# Patient Record
Sex: FEMALE | Race: WHITE
[De-identification: names, ages, dates, MRNs, and addresses within clinical notes are randomized per-mention and may not be internally consistent; named-entity substitution may affect disease eponyms.]

---

## 2018-02-21 ENCOUNTER — HOSPITAL ENCOUNTER (OUTPATIENT)
Dept: HOSPITAL 45 - C.LABPBG | Age: 63
Discharge: HOME | End: 2018-02-21
Attending: SURGERY
Payer: COMMERCIAL

## 2018-02-21 DIAGNOSIS — I70.219: Primary | ICD-10-CM

## 2018-02-21 LAB
BUN SERPL-MCNC: 15 MG/DL (ref 7–18)
CREAT SERPL-MCNC: 0.82 MG/DL (ref 0.6–1.2)

## 2018-02-26 ENCOUNTER — HOSPITAL ENCOUNTER (OUTPATIENT)
Dept: HOSPITAL 45 - C.CTS | Age: 63
Discharge: HOME | End: 2018-02-26
Attending: SURGERY
Payer: COMMERCIAL

## 2018-02-26 DIAGNOSIS — I70.1: ICD-10-CM

## 2018-02-26 DIAGNOSIS — I70.211: Primary | ICD-10-CM

## 2018-02-26 DIAGNOSIS — I70.0: ICD-10-CM

## 2018-02-26 NOTE — DIAGNOSTIC IMAGING REPORT
ANGIO AA LOR LE RUNOFF



CT DOSE: 938.66 mGy.cm



HISTORY:  62 years-old Female presents with bilateral lower extremity pain for 3

to 4 months. Concern for peripheral vascular disease.



TECHNIQUE: Multiple CTA images of the abdomen and pelvis with bilateral lower

extremity runoff were obtained after the intravenous administration of 120 ml

Optiray 320.  Coronal and sagittal MIPS were obtained from the axial data set

and were submitted for review.  All measurements were obtained according to

NASCET criteria. A dose lowering technique was utilized adhering to the

principles of ALARA.



COMPARISON: Chest radiographs 6/30/2009.



FINDINGS: 



CTA:  

Moderate enlargement of the imaged inferior cardiac chambers. There is extensive

mixed atheromatous and atherosclerotic plaquing of the imaged descending

thoracic as well as the abdominal aorta. No abdominal aortic aneurysm or

dissection. There is mild ectasia of the distal abdominal aorta which measures

2.3 x 2.2 cm.



There is atherosclerotic plaquing noted at the origins of the celiac and

superior mesenteric arteries causing less than 50% luminal narrowing. The

inferior mesenteric artery is widely patent. Distal branches of the superior

mesenteric artery also demonstrate prominent mixed plaquing. 60% luminal

narrowing involves the origin of the right renal artery secondary to calcific

plaquing. No significant stenosis of the left renal artery.



Extensive mixed plaquing is also seen extending into the bilateral common,

external and internal iliac arteries prominent atheromatous plaquing of the

right external iliac artery is seen on image 306 series 3 causing 50% luminal

narrowing. There is extensive mixed plaquing at the bifurcation of the

superficial and profunda femoris arteries on the left resulting in approximately

50% luminal narrowing. 50% luminal narrowing involves the mid left superficial

femoral artery on image 456 series 3. 70% luminal narrowing involves the right

superficial femoral artery image 512 series 3 secondary to atheromatous

plaquing. The bilateral popliteal arteries are patent.



Prominent mixed plaquing is noted throughout the bilateral lower extremities

below the level of the knees however there is no focal vessel occlusion or

high-grade narrowing identified. Three-vessel arterial inflow extends to the

level of the ankles bilaterally.



CT ABDOMEN/PELVIS:

Mild bibasilar bronchial wall thickening with areas of bronchial mucous plugging

and minimal subsegmental bibasilar atelectasis. There is no pneumatosis or

pneumoperitoneum identified.



The liver, gallbladder, spleen and right adrenal gland are unremarkable.

Indeterminate 7 x 8 mm nodule of the medial limb left adrenal gland is seen

which is soft tissue attenuating. There is moderate to severe generalized

pancreatic atrophy.



Kidneys, and ureters are unremarkable. There is mild urinary bladder distention.

Prior hysterectomy. No adnexal mass lesions identified. No bulky adenopathy.



There is no bowel obstruction or focal bowel wall thickening identified.

Moderate stool volume throughout the colon suggest constipation. Soft tissues

are unremarkable. Trace right-sided Baker's cyst. Cystic lesion measuring 2.8 x

1.4 cm is noted within the left popliteal fossa suggesting a Baker's cyst. The

bones appear intact. Multilevel facet arthrosis of the lumbar spine. Mild

degenerative changes about the bilateral feet.



IMPRESSION:  

1. Extensive mixed atheromatous and atherosclerotic plaquing of the aorta and

proximal branch vessels without evidence of aortic aneurysm or dissection.

2. 60% luminal narrowing involves the origin of the right renal artery secondary

to calcified plaque.

3. 70% luminal narrowing involves the mid right superficial femoral artery as

detailed above. There is three-vessel arterial flow to the level of the ankles

bilaterally.

4. No acute intra-abdominal or intrapelvic abnormality identified.

5. Additional findings as above.







The above report was generated using voice recognition software. It may contain

grammatical, syntax or spelling errors.











Electronically signed by:  Milton Ash M.D.

2/26/2018 12:16 PM



Dictated Date/Time:  2/26/2018 11:59 AM

## 2018-03-14 ENCOUNTER — HOSPITAL ENCOUNTER (OUTPATIENT)
Dept: HOSPITAL 45 - C.LABPBG | Age: 63
Discharge: HOME | End: 2018-03-14
Attending: INTERNAL MEDICINE
Payer: COMMERCIAL

## 2018-03-14 DIAGNOSIS — Z01.818: Primary | ICD-10-CM

## 2018-03-14 LAB
BUN SERPL-MCNC: 11 MG/DL (ref 7–18)
CALCIUM SERPL-MCNC: 8.6 MG/DL (ref 8.5–10.1)
CO2 SERPL-SCNC: 26 MMOL/L (ref 21–32)
CREAT SERPL-MCNC: 0.63 MG/DL (ref 0.6–1.2)
EOSINOPHIL NFR BLD AUTO: 313 K/UL (ref 130–400)
GLUCOSE SERPL-MCNC: 120 MG/DL (ref 70–99)
HCT VFR BLD CALC: 24.6 % (ref 37–47)
HGB BLD-MCNC: 7.2 G/DL (ref 12–16)
INR PPP: 0.9 (ref 0.9–1.1)
MCH RBC QN AUTO: 19.7 PG (ref 25–34)
MCHC RBC AUTO-ENTMCNC: 29.3 G/DL (ref 32–36)
MCV RBC AUTO: 67.4 FL (ref 80–100)
PMV BLD AUTO: 8.9 FL (ref 7.4–10.4)
POTASSIUM SERPL-SCNC: 3.4 MMOL/L (ref 3.5–5.1)
PTT PATIENT: 25.2 SECONDS (ref 21–31)
RED CELL DISTRIBUTION WIDTH CV: 17.4 % (ref 11.5–14.5)
RED CELL DISTRIBUTION WIDTH SD: 43 FL (ref 36.4–46.3)
SODIUM SERPL-SCNC: 138 MMOL/L (ref 136–145)
WBC # BLD AUTO: 9.14 K/UL (ref 4.8–10.8)

## 2018-03-20 ENCOUNTER — HOSPITAL ENCOUNTER (OUTPATIENT)
Dept: HOSPITAL 45 - C.NUCL | Age: 63
Discharge: HOME | End: 2018-03-20
Attending: PHYSICIAN ASSISTANT
Payer: COMMERCIAL

## 2018-03-20 DIAGNOSIS — I70.219: ICD-10-CM

## 2018-03-20 DIAGNOSIS — R07.9: ICD-10-CM

## 2018-03-20 DIAGNOSIS — I10: Primary | ICD-10-CM

## 2018-03-20 NOTE — MYOCARDIAL PERFUSION STUDY
Myocardial Perfusion Study Rpt


Myocardial Perfusion Study Rpt


Date of Service


3/20/2018


Myocardial Perfusion Study Rpt


Procedure:


1. Myocardial perfusion study performed in multiple views/images


2. Lexiscan pharmacologic stress ECG





Indications:


1. Chest pain





Consent:  Informed written consent was obtained prior to the procedure.


Ordering physician: Tamie Herron PA-C





Procedural details:  


For the stress portion of the study, Lexiscan 0.4 mg was intravenously 

administered followed by a saline flush.  This was followed by 31.5 mCi of 

technetium 99m Cardiolite, injected at 11:40 a.m. on 03/20/2018.  30 minutes 

following the injection, imaging of the heart was performed in multiple 

projections.  For the rest portion of the study, 10.8 mCi technetium 99m 

Cardiolite was injected intravenously at 9:45 a.m. on 03/20/2018.  1 hour 

following the injection, imaging of the heart was performed in the same 

projections.





Lexiscan stress ECG:





Resting ECG demonstrated:  Sinus rhythm at 74 bpm.


Maximum heart rate:  95 bpm


Resting blood pressure:  173/84 mmHg


Maximum blood pressure:  173/84 mmHg


Maximal, age-predicted heart rate:  60 %


Significant ST changes:  None


Arrhythmia:  None 


Symptoms:  No chest pain reported.





Findings:


Rotating raw imaging demonstrated no significant lung uptake.  There is no 

significant motion artifact.  Heart size appeared normal.  Myocardial perfusion 

demonstrated a small area of mildly reduced uptake involving the mid to distal 

anteroseptum which was fixed in post stress and rest imaging.  There were no 

significant reversible defects to suggest ischemia.





Ejection fraction:  73 %


Wall motion:  Normal


No significant transient ischemic dilation.





Impression:


1. Negative myocardial perfusion study for ischemia.


2.  Small, fixed mid to distal anteroseptal defect likely attenuation artifact 

given normal wall motion.


3. Normal wall motion.


4. Normal left ventricular systolic function.  EF 73%.


5. No chest pain or arrhythmia.


6. Nondiagnostic Lexiscan ECG.

## 2018-03-29 ENCOUNTER — HOSPITAL ENCOUNTER (EMERGENCY)
Dept: HOSPITAL 45 - C.EDB | Age: 63
LOS: 1 days | Discharge: HOME | End: 2018-03-30
Payer: COMMERCIAL

## 2018-03-29 VITALS
OXYGEN SATURATION: 95 % | SYSTOLIC BLOOD PRESSURE: 137 MMHG | TEMPERATURE: 98.78 F | HEART RATE: 85 BPM | DIASTOLIC BLOOD PRESSURE: 74 MMHG

## 2018-03-29 VITALS
BODY MASS INDEX: 26.91 KG/M2 | HEIGHT: 64.02 IN | BODY MASS INDEX: 26.91 KG/M2 | WEIGHT: 157.63 LBS | HEIGHT: 64.02 IN | WEIGHT: 157.63 LBS

## 2018-03-29 VITALS
HEART RATE: 93 BPM | TEMPERATURE: 98.6 F | OXYGEN SATURATION: 92 % | SYSTOLIC BLOOD PRESSURE: 162 MMHG | DIASTOLIC BLOOD PRESSURE: 67 MMHG

## 2018-03-29 VITALS
SYSTOLIC BLOOD PRESSURE: 140 MMHG | TEMPERATURE: 98.6 F | HEART RATE: 86 BPM | DIASTOLIC BLOOD PRESSURE: 63 MMHG | OXYGEN SATURATION: 93 %

## 2018-03-29 VITALS
OXYGEN SATURATION: 93 % | DIASTOLIC BLOOD PRESSURE: 77 MMHG | TEMPERATURE: 98.24 F | HEART RATE: 86 BPM | SYSTOLIC BLOOD PRESSURE: 160 MMHG

## 2018-03-29 DIAGNOSIS — R06.02: ICD-10-CM

## 2018-03-29 DIAGNOSIS — E78.5: ICD-10-CM

## 2018-03-29 DIAGNOSIS — M19.90: ICD-10-CM

## 2018-03-29 DIAGNOSIS — D64.9: Primary | ICD-10-CM

## 2018-03-29 DIAGNOSIS — I10: ICD-10-CM

## 2018-03-29 DIAGNOSIS — F17.200: ICD-10-CM

## 2018-03-29 DIAGNOSIS — Z90.710: ICD-10-CM

## 2018-03-29 DIAGNOSIS — Z79.82: ICD-10-CM

## 2018-03-29 DIAGNOSIS — J44.9: ICD-10-CM

## 2018-03-29 LAB
ALBUMIN SERPL-MCNC: 4.1 GM/DL (ref 3.4–5)
ALP SERPL-CCNC: 110 U/L (ref 45–117)
ALT SERPL-CCNC: 14 U/L (ref 12–78)
AST SERPL-CCNC: 14 U/L (ref 15–37)
BASOPHILS # BLD: 0.05 K/UL (ref 0–0.2)
BASOPHILS NFR BLD: 0.5 %
BUN SERPL-MCNC: 9 MG/DL (ref 7–18)
CALCIUM SERPL-MCNC: 9.1 MG/DL (ref 8.5–10.1)
CO2 SERPL-SCNC: 25 MMOL/L (ref 21–32)
CREAT SERPL-MCNC: 0.65 MG/DL (ref 0.6–1.2)
EOS ABS #: 0.2 K/UL (ref 0–0.5)
EOSINOPHIL NFR BLD AUTO: 419 K/UL (ref 130–400)
GLUCOSE SERPL-MCNC: 96 MG/DL (ref 70–99)
HCT VFR BLD CALC: 26.6 % (ref 37–47)
HGB BLD-MCNC: 7.4 G/DL (ref 12–16)
IG#: 0.04 K/UL (ref 0–0.02)
IMM GRANULOCYTES NFR BLD AUTO: 24.9 %
INR PPP: 0.9 (ref 0.9–1.1)
LIPASE: 50 U/L (ref 73–393)
LYMPHOCYTES # BLD: 2.71 K/UL (ref 1.2–3.4)
MCH RBC QN AUTO: 18.6 PG (ref 25–34)
MCHC RBC AUTO-ENTMCNC: 27.8 G/DL (ref 32–36)
MCV RBC AUTO: 66.8 FL (ref 80–100)
MONO ABS #: 0.92 K/UL (ref 0.11–0.59)
MONOCYTES NFR BLD: 8.4 %
NEUT ABS #: 6.98 K/UL (ref 1.4–6.5)
NEUTROPHILS # BLD AUTO: 1.8 %
NEUTROPHILS NFR BLD AUTO: 64 %
PMV BLD AUTO: 8.6 FL (ref 7.4–10.4)
POTASSIUM SERPL-SCNC: 3.6 MMOL/L (ref 3.5–5.1)
PROT SERPL-MCNC: 8.2 GM/DL (ref 6.4–8.2)
PTT PATIENT: 24.6 SECONDS (ref 21–31)
RED CELL DISTRIBUTION WIDTH CV: 17.5 % (ref 11.5–14.5)
RED CELL DISTRIBUTION WIDTH SD: 43 FL (ref 36.4–46.3)
SODIUM SERPL-SCNC: 135 MMOL/L (ref 136–145)
WBC # BLD AUTO: 10.9 K/UL (ref 4.8–10.8)

## 2018-03-29 NOTE — DIAGNOSTIC IMAGING REPORT
CHEST ONE VIEW PORTABLE



CLINICAL HISTORY: 62 years-old Female presenting with Pt c/o SOB. 



TECHNIQUE: Portable upright AP view of the chest was obtained.



COMPARISON: 630/2009.



FINDINGS:

Atherosclerosis of aortic arch. Cardiac silhouette enlarged. Calcified granuloma

suggested in the right midlung. No other focal opacity. No large effusion or

pneumothorax. Osseous structures normal. Upper abdomen normal.



IMPRESSION:

1.  Cardiomegaly. Otherwise no acute cardiopulmonary disease.







Electronically signed by:  Semaj Go M.D.

3/29/2018 8:40 PM



Dictated Date/Time:  3/29/2018 8:40 PM

## 2018-03-29 NOTE — EMERGENCY ROOM VISIT NOTE
History


Report prepared by [place]:  Bruna Valentine


Under the Supervision of:  Dr. Ronal Guadarrama M.D.


First contact with patient:  19:44


Chief Complaint:  RESPIRATORY PROBLEMS


Stated Complaint:  TROUBLE BREATHING AND LEG PAIN


Nursing Triage Summary:  


Patient was referred by her PCP for low H&H, notes SOB and pain in her leg.





History of Present Illness


The patient is a 62 year old female who presents to the Emergency Room with 

complaints of constant shortness of breath beginning two weeks ago. She notes 

chest pain, leg pain, and a headache. She states her shortness of breath 

worsens with  walking.  The patient was referred to the ED by her PCP for a low 

H & H level. The patient's blood work was done two weeks ago and she was just 

notified of her low H & H level today. The patient is not on any blood 

thinners. She denies any abdominal pain or black or tarry stools. The patient 

had a blood transfusion previously when she delivered her daughter.





   Source of History:  patient


   Onset:  two weeks ago


   Position:  other (generalized)


   Quality:  other (shortness of breath)


   Timing:  constant


   Associated Symptoms:  + headache, + chest pain, + SOB, No abdominal pain, No 

melena





Review of Systems


See HPI for pertinent positives & negatives. A total of 10 systems reviewed and 

were otherwise negative.





Past Medical & Surgical


Medical Problems:


(1) Appendectomy


(2)  section


(3) History of - hypertension


(4) History of - hysterectomy








Family History





Patient reports no known family medical history.





Social History


Smoking Status:  Current Every Day Smoker


Alcohol Use:  none


Drug Use:  none


Marital Status:  single


Housing Status:  lives alone





Current/Historical Medications


Scheduled


Amitriptyline Hcl (Amitriptyline Hcl), 50 MG PO HS


Aspirin (Aspirin Ec), 81 MG PO DAILY


Atorvastatin (Lipitor), 1 TAB PO DAILY


Fluticasone Prop/Salmeterol (Advair Diskus 500/50 60 Dose), 1 PUFF INH BID


Lisinopril (Zestril), 40 MG PO DAILY


Tramadol Hcl (Ultram),  MG PO Q6


Verapamil Hcl (Calan Sr Ext Rel), 240 MG PO DAILY





Scheduled PRN


Albuterol Hfa (Ventolin Hfa), 2 PUFFS INH


Dextromethorphan-Doxylamine-Ac (Coricidin Hbp Nighttime M), 10 ML PO HS PRN


Hydrocodon/Acetaminophen 10MG/300MG (Vicodin Hp (10MG/300MG)), 1 TAB PO for Pain





Miscellaneous Medications


Carisoprodol (Soma), 350 MG PO





Allergies


Coded Allergies:  


     No Known Allergies (Unverified , 3/29/18)





Physical Exam


Vital Signs











  Date Time  Temp Pulse Resp B/P (MAP) Pulse Ox O2 Delivery O2 Flow Rate FiO2


 


3/30/18 04:01 36.8   154/91 93   


 


3/30/18 03:55  88 21  90   


 


3/30/18 03:46    176/64    


 


3/30/18 03:40  93 16  97   


 


3/30/18 03:31    144/70    


 


3/30/18 03:25  86 36  93   


 


3/30/18 03:16    137/88    


 


3/30/18 03:10  80 18  91   


 


3/30/18 02:56 36.8       


 


3/30/18 02:55  83 23  92   


 


3/30/18 02:50  82 21  92   


 


3/30/18 02:45    159/88    


 


3/30/18 02:35  82 20  92   


 


3/30/18 02:30    161/79    


 


3/30/18 02:20  84 20  92   


 


3/30/18 02:16    164/66    


 


3/30/18 02:07 36.9 85 21  94   


 


3/30/18 02:05  85 26  91   


 


3/30/18 02:00    156/82    


 


3/30/18 01:56  84 23  91   


 


3/30/18 01:46    139/65    


 


3/30/18 01:41  82 24  91   


 


3/30/18 01:31    137/57    


 


3/30/18 01:26  81 25  90   


 


3/30/18 01:23 37.2 78 19 167/78 93 Room Air  


 


3/30/18 01:21  81 22  92   


 


3/30/18 01:15    153/102    


 


3/30/18 01:06  84 23  91   


 


3/30/18 01:05 37.3 84 17     


 


3/30/18 01:01    145/69    


 


3/30/18 00:56  85 19  91   


 


3/30/18 00:51    142/72    


 


3/30/18 00:50 36.9 82 20 142/72 93 Room Air  


 


3/30/18 00:46  85 20 161/70 95 Room Air  


 


3/30/18 00:41  84 23  95   


 


3/30/18 00:40 37.1 84 20 153/60 95   


 


3/30/18 00:40    153/60    


 


3/30/18 00:26  82 19  92   


 


3/30/18 00:21  83 20  91   


 


3/30/18 00:15    148/64    


 


3/30/18 00:06  89 22  93   


 


3/30/18 00:00    141/52    


 


3/29/18 23:51  85 18  90   


 


3/29/18 23:45    125/59    


 


3/29/18 23:36  82 18  93   


 


3/29/18 23:36     92   


 


3/29/18 23:31    137/46    


 


3/29/18 23:21  83 17  94   


 


3/29/18 23:17    156/69    


 


3/29/18 23:15     91   


 


3/29/18 23:15  84 22 156/69 93 Room Air  


 


3/29/18 23:06  85 19  91   


 


3/29/18 23:01    137/74    


 


3/29/18 23:00 37.1 85 22 137/74 95   


 


3/29/18 23:00  86 24  90   


 


3/29/18 22:43 37.0 86 22 140/63 93   


 


3/29/18 22:38 36.8 86 20 160/77 93   


 


3/29/18 22:33 37.0 93 25 162/67 92   


 


3/29/18 22:07  87 18 130/68 95 Room Air  


 


3/29/18 21:00 36.8 90 18 129/76 95 Room Air  


 


3/29/18 20:06     91 Room Air  


 


3/29/18 19:50  87      


 


3/29/18 19:11     95 Room Air  


 


3/29/18 19:09 36.8 89 18 136/67 94 Room Air  











Physical Exam


GENERAL: Awake, alert, well-appearing, in no acute distress


HENT: Normocephalic, atraumatic. Oropharynx unremarkable.


EYES: Normal conjunctiva. Sclera non-icteric.


NECK: Supple. No nuchal rigidity. FROM. No JVD.


RESPIRATORY: Clear to auscultation.


CARDIAC: Regular rate, normal rhythm. Extremities warm and well perfused. 

Pulses equal.


ABDOMEN: Soft, non-distended. No tenderness to palpation. No rebound or 

guarding. No masses.


RECTAL: Deferred.


MUSCULOSKELETAL: Chest examination reveals no tenderness. The back is 

symmetrical on inspection without obvious abnormality. There is no CVA 

tenderness to palpation. No joint edema. 


LOWER EXTREMITIES: Calves are equal size bilaterally and non-tender. No edema. 

No discoloration. 


NEURO: Normal sensorium. No sensory or motor deficits noted. 


SKIN: No rash or jaundice noted.





Medical Decision & Procedures


ER Provider


Diagnostic Interpretation:


Radiology results as stated below per my review and radiologist interpretation:





CHEST ONE VIEW PORTABLE.





FINDINGS:


Atherosclerosis of aortic arch. Cardiac silhouette enlarged. Calcified granuloma


suggested in the right midlung. No other focal opacity. No large effusion or


pneumothorax. Osseous structures normal. Upper abdomen normal.





IMPRESSION:


1.  Cardiomegaly. Otherwise no acute cardiopulmonary disease.











Electronically signed by:  Semaj Go M.D.





VENOUS DOPPLER LWR EXT BILA





FINDINGS: 





Right:


Common femoral vein: Patent.


Greater saphenous vein: Patent.


Deep femoral vein: Patent.


Femoral vein: Patent.


Popliteal vein: Patent.


Calf veins: Limited visualization.





Left:


Common femoral vein: Patent.


Greater saphenous vein: Patent.


Deep femoral vein: Patent.


Femoral vein: Patent.


Popliteal vein: Patent.


Calf veins: Limited visualization.





Other: Anechoic lobular 3.3 x 1.7 x 2.4 cm simple appearing cystic structure in


the left popliteal fossa most consistent with a popliteal cyst.





IMPRESSION:


No evidence of deep venous thrombosis.











Electronically signed by:  Semaj Go M.D.





Laboratory Results


3/29/18 20:21








Red Blood Count 3.98, Mean Corpuscular Volume 66.8, Mean Corpuscular Hemoglobin 

18.6, Mean Corpuscular Hemoglobin Concent 27.8, Mean Platelet Volume 8.6, 

Neutrophils (%) (Auto) 64.0, Lymphocytes (%) (Auto) 24.9, Monocytes (%) (Auto) 

8.4, Eosinophils (%) (Auto) 1.8, Basophils (%) (Auto) 0.5, Neutrophils # (Auto) 

6.98, Lymphocytes # (Auto) 2.71, Monocytes # (Auto) 0.92, Eosinophils # (Auto) 

0.20, Basophils # (Auto) 0.05





3/29/18 20:21

















Test


  3/29/18


20:21 3/29/18


20:23


 


White Blood Count


  10.90 K/uL


(4.8-10.8) 


 


 


Red Blood Count


  3.98 M/uL


(4.2-5.4) 


 


 


Hemoglobin


  7.4 g/dL


(12.0-16.0) 


 


 


Hematocrit 26.6 % (37-47)  


 


Mean Corpuscular Volume


  66.8 fL


() 


 


 


Mean Corpuscular Hemoglobin


  18.6 pg


(25-34) 


 


 


Mean Corpuscular Hemoglobin


Concent 27.8 g/dl


(32-36) 


 


 


Platelet Count


  419 K/uL


(130-400) 


 


 


Mean Platelet Volume


  8.6 fL


(7.4-10.4) 


 


 


Neutrophils (%) (Auto) 64.0 %  


 


Lymphocytes (%) (Auto) 24.9 %  


 


Monocytes (%) (Auto) 8.4 %  


 


Eosinophils (%) (Auto) 1.8 %  


 


Basophils (%) (Auto) 0.5 %  


 


Neutrophils # (Auto)


  6.98 K/uL


(1.4-6.5) 


 


 


Lymphocytes # (Auto)


  2.71 K/uL


(1.2-3.4) 


 


 


Monocytes # (Auto)


  0.92 K/uL


(0.11-0.59) 


 


 


Eosinophils # (Auto)


  0.20 K/uL


(0-0.5) 


 


 


Basophils # (Auto)


  0.05 K/uL


(0-0.2) 


 


 


RDW Standard Deviation


  43.0 fL


(36.4-46.3) 


 


 


RDW Coefficient of Variation


  17.5 %


(11.5-14.5) 


 


 


Immature Granulocyte % (Auto) 0.4 %  


 


Immature Granulocyte # (Auto)


  0.04 K/uL


(0.00-0.02) 


 


 


Polychromasia 1+  


 


Hypochromasia PRESENT  


 


Anisocytosis PRESENT  


 


Microcytosis PRESENT  


 


Prothrombin Time


  9.5 SECONDS


(9.0-12.0) 


 


 


Prothromb Time International


Ratio 0.9 (0.9-1.1) 


  


 


 


Activated Partial


Thromboplast Time 24.6 SECONDS


(21.0-31.0) 


 


 


Partial Thromboplastin Ratio 0.9  


 


Anion Gap


  6.0 mmol/L


(3-11) 


 


 


Est Creatinine Clear Calc


Drug Dose 85.9 ml/min 


  


 


 


Estimated GFR (


American) 109.5 


  


 


 


Estimated GFR (Non-


American 94.5 


  


 


 


BUN/Creatinine Ratio 13.8 (10-20)  


 


Calcium Level


  9.1 mg/dl


(8.5-10.1) 


 


 


Total Bilirubin


  0.2 mg/dl


(0.2-1) 


 


 


Direct Bilirubin


  < 0.1 mg/dl


(0-0.2) 


 


 


Aspartate Amino Transf


(AST/SGOT) 14 U/L (15-37) 


  


 


 


Alanine Aminotransferase


(ALT/SGPT) 14 U/L (12-78) 


  


 


 


Alkaline Phosphatase


  110 U/L


() 


 


 


Total Protein


  8.2 gm/dl


(6.4-8.2) 


 


 


Albumin


  4.1 gm/dl


(3.4-5.0) 


 


 


Lipase


  50 U/L


() 


 


 


Urine Color  YELLOW 


 


Urine Appearance  CLEAR (CLEAR) 


 


Urine pH  6.5 (4.5-7.5) 


 


Urine Specific Gravity


  


  1.008


(1.000-1.030)


 


Urine Protein  NEG (NEG) 


 


Urine Glucose (UA)  NEG (NEG) 


 


Urine Ketones  NEG (NEG) 


 


Urine Occult Blood  NEG (NEG) 


 


Urine Nitrite  NEG (NEG) 


 


Urine Bilirubin  NEG (NEG) 


 


Urine Urobilinogen  NEG (NEG) 


 


Urine Leukocyte Esterase  SMALL (NEG) 


 


Urine WBC (Auto)


  


  10-30 /hpf


(0-5)


 


Urine RBC (Auto)  0-4 /hpf (0-4) 


 


Urine Hyaline Casts (Auto)  1-5 /lpf (0-5) 


 


Urine Epithelial Cells (Auto)  >30 /lpf (0-5) 


 


Urine Bacteria (Auto)  1+ (NEG) 





Labs reviewed by ED physician.





Medications Administered











 Medications


  (Trade)  Dose


 Ordered  Sig/Celestine


 Route  Start Time


 Stop Time Status Last Admin


Dose Admin


 


 Albuterol/


 Ipratropium


  (Duoneb)  12 ml  ONE  ONCE


 INH  3/29/18 20:00


 3/29/18 20:01 DC 3/29/18 20:15


12 ML











ECG Per My Interpretation


Indication:  SOB/dyspnea


Rate (beats per minute):  84


Rhythm:  normal sinus


Findings:  other (no ST elevation or depression, normal axis )





ED Course


: Past medical records reviewed. The patient was evaluated in room B7. A 

complete history and physical examination was performed. 





: Ordered Duoneb 12 ml INH. 





: I discussed the patient's case with Dr. Pedrito ALVARADO, she has agreed to 

evaluate the patient for further management and care.





Medical Decision


Differential diagnosis:


Etiologies such as infections, reactive airway disease, pneumonia, pneumothorax

, COPD, CHF, cardiac ischemia, pulmonary embolism, musculoskeletal, 

gastrointestinal, as well as others were entertained.





This is a 62-year-old female presents emergency department complaining of 

generalized weakness as long shortness of breath.  The patient was found to be 

anemic and sent into the emergency department for blood cells.  The patient 

willingly signed the consent and was placed on the chart.  The patient is 

refusing to be admitted to the hospital due to  holiday.  For this reason 

she was given her packed red blood cells and will follow up with her primary 

care physician.





Medication Reconcilliation


Current Medication List:  was personally reviewed by me





Blood Pressure Screening


Patient's blood pressure:  Elevated blood pressure


Blood pressure disposition:  Referred to PCP (refer to hospitalist)





Consults


Time Called:  


Consulting Physician:  Dr. Pedrito ALVARADO


Returned Call:  


I discussed the patient's case with Dr. Pedrito ALVARADO, she has agreed to evaluate 

the patient for further management and care.





Impression





 Primary Impression:  


 Anemia


 Additional Impression:  


 Shortness of breath





Scribe Attestation


The scribe's documentation has been prepared under my direction and personally 

reviewed by me in its entirety. I confirm that the note above accurately 

reflects all work, treatment, procedures, and medical decision making performed 

by me.





Departure Information


Dispostion


Being Evaluated By Hospitalist





Referrals


Eliazar Carrasco PA-C (PCP)





Patient Instructions


My [facility]





Problem Qualifiers








 Primary Impression:  


 Anemia


 Anemia type:  unspecified type  Qualified Codes:  D64.9 - Anemia, unspecified

## 2018-03-30 VITALS
DIASTOLIC BLOOD PRESSURE: 60 MMHG | OXYGEN SATURATION: 95 % | SYSTOLIC BLOOD PRESSURE: 153 MMHG | HEART RATE: 84 BPM | TEMPERATURE: 98.78 F

## 2018-03-30 VITALS — HEART RATE: 88 BPM

## 2018-03-30 VITALS — DIASTOLIC BLOOD PRESSURE: 91 MMHG | TEMPERATURE: 98.24 F | OXYGEN SATURATION: 93 % | SYSTOLIC BLOOD PRESSURE: 154 MMHG

## 2018-03-30 NOTE — MEDICAL CONSULT
Consultation


Date of Consultation:


Mar 30, 2018.


Attending Physician:





Reason for Consultation:


Possible medical admission


History of Present Illness


Mrs. Xiong is a very pleasant 61yo C female with history of Hypertension, 

COPD and Hyperlipidemia presenting today with symptomatic anemia.  She is 

presently undergoing preoperative workup for a femoral bypass surgery.  

Laboratory results showed marked anemia, Hg=7.2 and Hct=24.6.  This was 

confirmed in ER, 7.4 and 26.6 respectively.  Patient states that she is feeling 

well.  She has occasional intermittent chest tightness that is nonexertional - 

she was recently worked up for this with a nuclear stress test which was 

reported to her as normal.  She has a baseline shortness of breath that is 

stable and unchanged.  She has recently been diagnosed with mild COPD for which 

she was started on inhalers.  Patient denies blood loss.  No melena or 

hematochezia but does report a history of hemorrhoids.  No change in bowel 

habits, thin stools, weight loss or abdominal swelling.  She has never had a 

screening colonoscopy.  No hematuria, bruising or bleeding.  No changes in eye 

or skin color.  No additional complaints at this time.  In the ER she was 

administered a Duoneb and transfused with 2 units of PRBCs.





Past Medical/Surgical History


Medical Problems:


(1) Anemia


(2) COPD


(3) Arthritis


(4) Hypertension


(5) Hyperlipidemia





Past Surgical History:


 x 2


Appendectomy


Umbilical surgery


Hysterectomy for uterine cancer 33 years ago - negative Pap smears in followup








Family History





Patient reports no known family medical history.





Social History


Smoking Status:  Current Every Day Smoker


Smokeless Tobacco Use:  No


Alcohol Use:  none


Drug Use:  none


Marital Status:  single, 


Housing Status:  lives alone, lives with significant other





Allergies


Coded Allergies:  


     No Known Allergies (Unverified , 3/29/18)





Home Medications


Lisinopril 40mg po daily


Lipitor


Amitryptylline 100mg po qHS


Amlodipine 10mg po daily


Advair 500/50


Combivent


Tramadol 50mg po BID


Soma 350mg po qHS PRN


Hydrocodone/APAP 10mg po TID PRN





Review of Systems


Constitutional:  No fever, No chills, No weight loss, No weakness, No fatigue


ENT:  No unusual epistaxis


Respiratory:  + shortness of breath, No cough, No sputum, No wheezing


Cardiovascular:  + chest pain, No orthopnea, No edema, No palpitations


Abdomen:  No pain, No nausea, No vomiting, No diarrhea


Musculoskeletal:  No calf pain


Genitourinary - Female:  No dysuria, No urinary frequency, No hematuria


Hematologic / Lymphatic:  No abnormal bleeding/bruising





Physical Exam











  Date Time  Temp Pulse Resp B/P (MAP) Pulse Ox O2 Delivery O2 Flow Rate FiO2


 


3/29/18 23:36     92   


 


3/29/18 23:31    137/46    


 


3/29/18 23:21  83 17  94   


 


3/29/18 23:17    156/69    


 


3/29/18 23:15     91   


 


3/29/18 23:15  84 22 156/69 93 Room Air  


 


3/29/18 23:06  85 19  91   


 


3/29/18 23:01    137/74    


 


3/29/18 23:00 37.1 85 22 137/74 95   


 


3/29/18 23:00  86 24  90   


 


3/29/18 22:43 37.0 86 22 140/63 93   


 


3/29/18 22:38 36.8 86 20 160/77 93   


 


3/29/18 22:33 37.0 93 25 162/67 92   


 


3/29/18 22:07  87 18 130/68 95 Room Air  


 


3/29/18 21:00 36.8 90 18 129/76 95 Room Air  


 


3/29/18 20:06     91 Room Air  


 


3/29/18 19:50  87      


 


3/29/18 19:11     95 Room Air  


 


3/29/18 19:09 36.8 89 18 136/67 94 Room Air  








General Appearance:  WD/WN, no apparent distress


Head:  normocephalic, atraumatic


Eyes:  normal inspection, PERRL, EOMI, sclerae normal


ENT:  normal ENT inspection, pharynx normal


Neck:  supple, no adenopathy, thyroid normal, no JVD


Respiratory/Chest:  chest non-tender, lungs clear, normal breath sounds, no 

respiratory distress, no accessory muscle use


Cardiovascular:  regular rate, rhythm, no edema, no gallop, no JVD, no murmur (3

/6 KRIS at left 2nd ICS with radiation across precordium and to bilateral 

carotids), normal peripheral pulses


Abdomen/GI:  normal bowel sounds, non tender, soft, no organomegaly, no 

pulsatile mass


Extremities/Musculoskelatal:  normal inspection


Skin:  normal color, warm/dry, no rash


Lymphatic:  no adenopathy





Laboratory Results





Last 24 Hours








Test


  3/29/18


20:21 3/29/18


20:23


 


White Blood Count 10.90 K/uL  


 


Red Blood Count 3.98 M/uL  


 


Hemoglobin 7.4 g/dL  


 


Hematocrit 26.6 %  


 


Mean Corpuscular Volume 66.8 fL  


 


Mean Corpuscular Hemoglobin 18.6 pg  


 


Mean Corpuscular Hemoglobin


Concent 27.8 g/dl 


  


 


 


Platelet Count 419 K/uL  


 


Mean Platelet Volume 8.6 fL  


 


Neutrophils (%) (Auto) 64.0 %  


 


Lymphocytes (%) (Auto) 24.9 %  


 


Monocytes (%) (Auto) 8.4 %  


 


Eosinophils (%) (Auto) 1.8 %  


 


Basophils (%) (Auto) 0.5 %  


 


Neutrophils # (Auto) 6.98 K/uL  


 


Lymphocytes # (Auto) 2.71 K/uL  


 


Monocytes # (Auto) 0.92 K/uL  


 


Eosinophils # (Auto) 0.20 K/uL  


 


Basophils # (Auto) 0.05 K/uL  


 


RDW Standard Deviation 43.0 fL  


 


RDW Coefficient of Variation 17.5 %  


 


Immature Granulocyte % (Auto) 0.4 %  


 


Immature Granulocyte # (Auto) 0.04 K/uL  


 


Polychromasia 1+  


 


Hypochromasia PRESENT  


 


Anisocytosis PRESENT  


 


Microcytosis PRESENT  


 


Prothrombin Time 9.5 SECONDS  


 


Prothromb Time International


Ratio 0.9 


  


 


 


Activated Partial


Thromboplast Time 24.6 SECONDS 


  


 


 


Partial Thromboplastin Ratio 0.9  


 


Sodium Level 135 mmol/L  


 


Potassium Level 3.6 mmol/L  


 


Chloride Level 105 mmol/L  


 


Carbon Dioxide Level 25 mmol/L  


 


Anion Gap 6.0 mmol/L  


 


Blood Urea Nitrogen 9 mg/dl  


 


Creatinine 0.65 mg/dl  


 


Est Creatinine Clear Calc


Drug Dose 85.9 ml/min 


  


 


 


Estimated GFR (


American) 109.5 


  


 


 


Estimated GFR (Non-


American 94.5 


  


 


 


BUN/Creatinine Ratio 13.8  


 


Random Glucose 96 mg/dl  


 


Calcium Level 9.1 mg/dl  


 


Total Bilirubin 0.2 mg/dl  


 


Direct Bilirubin < 0.1 mg/dl  


 


Aspartate Amino Transf


(AST/SGOT) 14 U/L 


  


 


 


Alanine Aminotransferase


(ALT/SGPT) 14 U/L 


  


 


 


Alkaline Phosphatase 110 U/L  


 


Total Protein 8.2 gm/dl  


 


Albumin 4.1 gm/dl  


 


Lipase 50 U/L  


 


Urine Color  YELLOW 


 


Urine Appearance  CLEAR 


 


Urine pH  6.5 


 


Urine Specific Gravity  1.008 


 


Urine Protein  NEG 


 


Urine Glucose (UA)  NEG 


 


Urine Ketones  NEG 


 


Urine Occult Blood  NEG 


 


Urine Nitrite  NEG 


 


Urine Bilirubin  NEG 


 


Urine Urobilinogen  NEG 


 


Urine Leukocyte Esterase  SMALL 


 


Urine WBC (Auto)  10-30 /hpf 


 


Urine RBC (Auto)  0-4 /hpf 


 


Urine Hyaline Casts (Auto)  1-5 /lpf 


 


Urine Epithelial Cells (Auto)  >30 /lpf 


 


Urine Bacteria (Auto)  1+ 











Assessment & Plan


61yo C female with history of HTN, HLP and COPD presenting from clinic with 

anemia, Hg=7.4, Hct=26.6 - anemia is microcytic/hypochromic.  No evidence of 

acute blood loss.





1.  Anemia - Hg=7.4, Hct=26.6.  


-Patient would benefit from a full anemia workup to include iron studies, LDH, 

haptoglobin, reticulocyte count and peripheral blood smear. These studies may 

not be accurate if drawn now as patient is presently being transfused but 

should be pursued as an outpatient.  Differential diagnosis includes slow loss 

through GI tract secondary to hemorrhoids or, less likely, malignancy.  

Hemolysis and bone marrow dysfunction less likely.


-Repeat CBC as an outpatient


-Patient will also benefit from a screening colonoscopy


-I believe that she is medically stable for discharge from the ED after her 

transfusion of 2 units PRBCs is complete


-She is to followup with her PCP within 2-3 days


-Plan discussed with patient who is in agreement.





Remainder of medical problems are stable and well controlled.  She can continue 

her outpatient therapies as written.

## 2018-04-06 ENCOUNTER — HOSPITAL ENCOUNTER (OUTPATIENT)
Dept: HOSPITAL 45 - C.LABPBG | Age: 63
Discharge: HOME | End: 2018-04-06
Payer: COMMERCIAL

## 2018-04-06 DIAGNOSIS — D50.9: Primary | ICD-10-CM

## 2018-04-06 LAB
BASOPHILS # BLD: 0.03 K/UL (ref 0–0.2)
BASOPHILS NFR BLD: 0.4 %
EOS ABS #: 0.34 K/UL (ref 0–0.5)
EOSINOPHIL NFR BLD AUTO: 297 K/UL (ref 130–400)
HCT VFR BLD CALC: 30.4 % (ref 37–47)
HGB BLD-MCNC: 9.2 G/DL (ref 12–16)
IG#: 0.01 K/UL (ref 0–0.02)
IMM GRANULOCYTES NFR BLD AUTO: 28.4 %
LYMPHOCYTES # BLD: 2.3 K/UL (ref 1.2–3.4)
MCH RBC QN AUTO: 21.9 PG (ref 25–34)
MCHC RBC AUTO-ENTMCNC: 30.3 G/DL (ref 32–36)
MCV RBC AUTO: 72.4 FL (ref 80–100)
MONO ABS #: 0.7 K/UL (ref 0.11–0.59)
MONOCYTES NFR BLD: 8.7 %
NEUT ABS #: 4.71 K/UL (ref 1.4–6.5)
NEUTROPHILS # BLD AUTO: 4.2 %
NEUTROPHILS NFR BLD AUTO: 58.2 %
PMV BLD AUTO: 8.5 FL (ref 7.4–10.4)
RED CELL DISTRIBUTION WIDTH CV: 21.5 % (ref 11.5–14.5)
RED CELL DISTRIBUTION WIDTH SD: 56.7 FL (ref 36.4–46.3)
TRANSFERRIN SERPL-MCNC: 296 MG/DL (ref 200–360)
WBC # BLD AUTO: 8.09 K/UL (ref 4.8–10.8)

## 2018-07-30 ENCOUNTER — HOSPITAL ENCOUNTER (OUTPATIENT)
Dept: HOSPITAL 45 - C.LABPBG | Age: 63
Discharge: HOME | End: 2018-07-30
Attending: INTERNAL MEDICINE
Payer: COMMERCIAL

## 2018-07-30 DIAGNOSIS — D50.9: Primary | ICD-10-CM

## 2018-07-30 LAB
ALBUMIN SERPL-MCNC: 3.9 GM/DL (ref 3.4–5)
ALP SERPL-CCNC: 83 U/L (ref 45–117)
ALT SERPL-CCNC: 14 U/L (ref 12–78)
AST SERPL-CCNC: 12 U/L (ref 15–37)
BASOPHILS # BLD: 0.04 K/UL (ref 0–0.2)
BASOPHILS NFR BLD: 0.4 %
BUN SERPL-MCNC: 10 MG/DL (ref 7–18)
CALCIUM SERPL-MCNC: 8.4 MG/DL (ref 8.5–10.1)
CO2 SERPL-SCNC: 27 MMOL/L (ref 21–32)
CREAT SERPL-MCNC: 0.67 MG/DL (ref 0.6–1.2)
EOS ABS #: 0.29 K/UL (ref 0–0.5)
EOSINOPHIL NFR BLD AUTO: 341 K/UL (ref 130–400)
GLUCOSE SERPL-MCNC: 94 MG/DL (ref 70–99)
HCT VFR BLD CALC: 35.3 % (ref 37–47)
HGB BLD-MCNC: 11.2 G/DL (ref 12–16)
IG#: 0.03 K/UL (ref 0–0.02)
IMM GRANULOCYTES NFR BLD AUTO: 27.5 %
LYMPHOCYTES # BLD: 2.61 K/UL (ref 1.2–3.4)
MCH RBC QN AUTO: 26 PG (ref 25–34)
MCHC RBC AUTO-ENTMCNC: 31.7 G/DL (ref 32–36)
MCV RBC AUTO: 81.9 FL (ref 80–100)
MONO ABS #: 0.62 K/UL (ref 0.11–0.59)
MONOCYTES NFR BLD: 6.5 %
NEUT ABS #: 5.91 K/UL (ref 1.4–6.5)
NEUTROPHILS # BLD AUTO: 3.1 %
NEUTROPHILS NFR BLD AUTO: 62.2 %
PMV BLD AUTO: 8.9 FL (ref 7.4–10.4)
POTASSIUM SERPL-SCNC: 3.6 MMOL/L (ref 3.5–5.1)
PROT SERPL-MCNC: 7.2 GM/DL (ref 6.4–8.2)
RED CELL DISTRIBUTION WIDTH CV: 16.3 % (ref 11.5–14.5)
RED CELL DISTRIBUTION WIDTH SD: 48.9 FL (ref 36.4–46.3)
SODIUM SERPL-SCNC: 136 MMOL/L (ref 136–145)
WBC # BLD AUTO: 9.5 K/UL (ref 4.8–10.8)